# Patient Record
Sex: FEMALE | Race: WHITE | Employment: UNEMPLOYED | URBAN - METROPOLITAN AREA
[De-identification: names, ages, dates, MRNs, and addresses within clinical notes are randomized per-mention and may not be internally consistent; named-entity substitution may affect disease eponyms.]

---

## 2020-05-04 ENCOUNTER — APPOINTMENT (OUTPATIENT)
Dept: CT IMAGING | Age: 50
DRG: 754 | End: 2020-05-04
Payer: MEDICAID

## 2020-05-04 ENCOUNTER — HOSPITAL ENCOUNTER (INPATIENT)
Age: 50
LOS: 3 days | Discharge: HOME | DRG: 754 | End: 2020-05-07
Attending: EMERGENCY MEDICINE | Admitting: PSYCHIATRY & NEUROLOGY
Payer: MEDICAID

## 2020-05-04 PROBLEM — F39 UNSPECIFIED MOOD (AFFECTIVE) DISORDER (HCC): Status: ACTIVE | Noted: 2020-05-04

## 2020-05-04 LAB
A/G RATIO: 1.2 (ref 1.1–2.2)
ACETAMINOPHEN LEVEL: <5 UG/ML (ref 10–30)
ALBUMIN SERPL-MCNC: 4.7 G/DL (ref 3.4–5)
ALP BLD-CCNC: 176 U/L (ref 40–129)
ALT SERPL-CCNC: 65 U/L (ref 10–40)
AMPHETAMINE SCREEN, URINE: POSITIVE
ANION GAP SERPL CALCULATED.3IONS-SCNC: 14 MMOL/L (ref 3–16)
AST SERPL-CCNC: 59 U/L (ref 15–37)
BANDED NEUTROPHILS RELATIVE PERCENT: 1 % (ref 0–7)
BARBITURATE SCREEN URINE: ABNORMAL
BASOPHILS ABSOLUTE: 0 K/UL (ref 0–0.2)
BASOPHILS RELATIVE PERCENT: 0 %
BENZODIAZEPINE SCREEN, URINE: POSITIVE
BILIRUB SERPL-MCNC: 0.5 MG/DL (ref 0–1)
BUN BLDV-MCNC: 16 MG/DL (ref 7–20)
CALCIUM SERPL-MCNC: 9.9 MG/DL (ref 8.3–10.6)
CANNABINOID SCREEN URINE: POSITIVE
CHLORIDE BLD-SCNC: 98 MMOL/L (ref 99–110)
CO2: 25 MMOL/L (ref 21–32)
COCAINE METABOLITE SCREEN URINE: ABNORMAL
CREAT SERPL-MCNC: 0.9 MG/DL (ref 0.6–1.1)
EOSINOPHILS ABSOLUTE: 0.4 K/UL (ref 0–0.6)
EOSINOPHILS RELATIVE PERCENT: 4 %
ETHANOL: NORMAL MG/DL (ref 0–0.08)
GFR AFRICAN AMERICAN: >60
GFR NON-AFRICAN AMERICAN: >60
GLOBULIN: 3.9 G/DL
GLUCOSE BLD-MCNC: 134 MG/DL (ref 70–99)
HCT VFR BLD CALC: 47 % (ref 36–48)
HEMOGLOBIN: 16 G/DL (ref 12–16)
LYMPHOCYTES ABSOLUTE: 3 K/UL (ref 1–5.1)
LYMPHOCYTES RELATIVE PERCENT: 33 %
Lab: ABNORMAL
MCH RBC QN AUTO: 29.8 PG (ref 26–34)
MCHC RBC AUTO-ENTMCNC: 34 G/DL (ref 31–36)
MCV RBC AUTO: 87.5 FL (ref 80–100)
METHADONE SCREEN, URINE: ABNORMAL
MONOCYTES ABSOLUTE: 0.3 K/UL (ref 0–1.3)
MONOCYTES RELATIVE PERCENT: 3 %
NEUTROPHILS ABSOLUTE: 5.5 K/UL (ref 1.7–7.7)
NEUTROPHILS RELATIVE PERCENT: 59 %
OPIATE SCREEN URINE: ABNORMAL
OXYCODONE URINE: ABNORMAL
PDW BLD-RTO: 13.7 % (ref 12.4–15.4)
PH UA: 5
PHENCYCLIDINE SCREEN URINE: ABNORMAL
PLATELET # BLD: 363 K/UL (ref 135–450)
PMV BLD AUTO: 7.3 FL (ref 5–10.5)
POTASSIUM REFLEX MAGNESIUM: 4.4 MMOL/L (ref 3.5–5.1)
PROPOXYPHENE SCREEN: ABNORMAL
RBC # BLD: 5.37 M/UL (ref 4–5.2)
SALICYLATE, SERUM: <0.3 MG/DL (ref 15–30)
SODIUM BLD-SCNC: 137 MMOL/L (ref 136–145)
TOTAL PROTEIN: 8.6 G/DL (ref 6.4–8.2)
TSH SERPL DL<=0.05 MIU/L-ACNC: 1.49 UIU/ML (ref 0.27–4.2)
WBC # BLD: 9.1 K/UL (ref 4–11)

## 2020-05-04 PROCEDURE — 36415 COLL VENOUS BLD VENIPUNCTURE: CPT

## 2020-05-04 PROCEDURE — 85025 COMPLETE CBC W/AUTO DIFF WBC: CPT

## 2020-05-04 PROCEDURE — 6370000000 HC RX 637 (ALT 250 FOR IP): Performed by: EMERGENCY MEDICINE

## 2020-05-04 PROCEDURE — G0480 DRUG TEST DEF 1-7 CLASSES: HCPCS

## 2020-05-04 PROCEDURE — 84443 ASSAY THYROID STIM HORMONE: CPT

## 2020-05-04 PROCEDURE — 6370000000 HC RX 637 (ALT 250 FOR IP): Performed by: PSYCHIATRY & NEUROLOGY

## 2020-05-04 PROCEDURE — 80053 COMPREHEN METABOLIC PANEL: CPT

## 2020-05-04 PROCEDURE — U0003 INFECTIOUS AGENT DETECTION BY NUCLEIC ACID (DNA OR RNA); SEVERE ACUTE RESPIRATORY SYNDROME CORONAVIRUS 2 (SARS-COV-2) (CORONAVIRUS DISEASE [COVID-19]), AMPLIFIED PROBE TECHNIQUE, MAKING USE OF HIGH THROUGHPUT TECHNOLOGIES AS DESCRIBED BY CMS-2020-01-R: HCPCS

## 2020-05-04 PROCEDURE — 1240000000 HC EMOTIONAL WELLNESS R&B

## 2020-05-04 PROCEDURE — U0002 COVID-19 LAB TEST NON-CDC: HCPCS

## 2020-05-04 PROCEDURE — 70450 CT HEAD/BRAIN W/O DYE: CPT

## 2020-05-04 PROCEDURE — 99285 EMERGENCY DEPT VISIT HI MDM: CPT

## 2020-05-04 PROCEDURE — 80307 DRUG TEST PRSMV CHEM ANLYZR: CPT

## 2020-05-04 PROCEDURE — 93005 ELECTROCARDIOGRAM TRACING: CPT | Performed by: PHYSICIAN ASSISTANT

## 2020-05-04 RX ORDER — GABAPENTIN 400 MG/1
800 CAPSULE ORAL 3 TIMES DAILY
Status: DISCONTINUED | OUTPATIENT
Start: 2020-05-04 | End: 2020-05-07 | Stop reason: HOSPADM

## 2020-05-04 RX ORDER — BUPRENORPHINE AND NALOXONE 8; 2 MG/1; MG/1
2 FILM, SOLUBLE BUCCAL; SUBLINGUAL DAILY
COMMUNITY

## 2020-05-04 RX ORDER — GABAPENTIN 800 MG/1
800 TABLET ORAL 3 TIMES DAILY
COMMUNITY

## 2020-05-04 RX ORDER — IBUPROFEN 400 MG/1
400 TABLET ORAL EVERY 6 HOURS PRN
Status: DISCONTINUED | OUTPATIENT
Start: 2020-05-04 | End: 2020-05-07 | Stop reason: HOSPADM

## 2020-05-04 RX ORDER — TRAZODONE HYDROCHLORIDE 50 MG/1
50 TABLET ORAL NIGHTLY PRN
Status: DISCONTINUED | OUTPATIENT
Start: 2020-05-04 | End: 2020-05-07 | Stop reason: HOSPADM

## 2020-05-04 RX ORDER — OLANZAPINE 10 MG/1
10 INJECTION, POWDER, LYOPHILIZED, FOR SOLUTION INTRAMUSCULAR
Status: DISCONTINUED | OUTPATIENT
Start: 2020-05-04 | End: 2020-05-04

## 2020-05-04 RX ORDER — ACETAMINOPHEN 325 MG/1
650 TABLET ORAL EVERY 4 HOURS PRN
Status: DISCONTINUED | OUTPATIENT
Start: 2020-05-04 | End: 2020-05-07 | Stop reason: HOSPADM

## 2020-05-04 RX ORDER — MAGNESIUM HYDROXIDE/ALUMINUM HYDROXICE/SIMETHICONE 120; 1200; 1200 MG/30ML; MG/30ML; MG/30ML
30 SUSPENSION ORAL EVERY 6 HOURS PRN
Status: DISCONTINUED | OUTPATIENT
Start: 2020-05-04 | End: 2020-05-07 | Stop reason: HOSPADM

## 2020-05-04 RX ORDER — BENZTROPINE MESYLATE 1 MG/ML
2 INJECTION INTRAMUSCULAR; INTRAVENOUS 2 TIMES DAILY PRN
Status: DISCONTINUED | OUTPATIENT
Start: 2020-05-04 | End: 2020-05-07 | Stop reason: HOSPADM

## 2020-05-04 RX ORDER — HYDROXYZINE PAMOATE 50 MG/1
50 CAPSULE ORAL ONCE
Status: COMPLETED | OUTPATIENT
Start: 2020-05-04 | End: 2020-05-04

## 2020-05-04 RX ORDER — OLANZAPINE 10 MG/1
10 TABLET ORAL
Status: DISCONTINUED | OUTPATIENT
Start: 2020-05-04 | End: 2020-05-04

## 2020-05-04 RX ADMIN — HYDROXYZINE PAMOATE 50 MG: 50 CAPSULE ORAL at 18:22

## 2020-05-04 RX ADMIN — GABAPENTIN 800 MG: 400 CAPSULE ORAL at 22:49

## 2020-05-04 SDOH — HEALTH STABILITY: MENTAL HEALTH: HOW OFTEN DO YOU HAVE A DRINK CONTAINING ALCOHOL?: NEVER

## 2020-05-04 ASSESSMENT — SLEEP AND FATIGUE QUESTIONNAIRES
DIFFICULTY ARISING: NO
AVERAGE NUMBER OF SLEEP HOURS: 5
DO YOU USE A SLEEP AID: COMMENT
DIFFICULTY FALLING ASLEEP: YES
DO YOU HAVE DIFFICULTY SLEEPING: YES
RESTFUL SLEEP: NO
SLEEP PATTERN: DIFFICULTY FALLING ASLEEP;NIGHTMARES/TERRORS
DIFFICULTY STAYING ASLEEP: YES

## 2020-05-04 ASSESSMENT — PAIN DESCRIPTION - LOCATION: LOCATION: LEG

## 2020-05-04 ASSESSMENT — PATIENT HEALTH QUESTIONNAIRE - PHQ9: SUM OF ALL RESPONSES TO PHQ QUESTIONS 1-9: 18

## 2020-05-04 ASSESSMENT — PAIN DESCRIPTION - DESCRIPTORS: DESCRIPTORS: TINGLING

## 2020-05-04 ASSESSMENT — LIFESTYLE VARIABLES: HISTORY_ALCOHOL_USE: NO

## 2020-05-04 ASSESSMENT — PAIN SCALES - GENERAL: PAINLEVEL_OUTOF10: 5

## 2020-05-04 NOTE — ED PROVIDER NOTES
[address] ED  EMERGENCY DEPARTMENT ENCOUNTER        Pt Name: Claudene Brave  MRN: [id number]  [place] 1970  Date of evaluation: 5/4/2020  Provider: JEANNE Ward  PCP: Jeff Peña MD    This patient was seen and evaluated by the attending physician Chinedu Gonzalez MD.      [address] [place] [address]       Chief Complaint   Patient presents with    Suicidal     pt has thoughts of wanting to hurt herself. pt has hurt self in the past 2 months pt was going to OD pt states that she didnt take the pills. pt has thoughts of OD/cutting wrist.        HISTORY OF PRESENT ILLNESS   (Location/Symptom, Timing/Onset, Context/Setting, Quality, Duration, Modifying Factors, Severity)  Note limiting factors. Claudene Brave is a 53 y.o. female who presents via private vehicle for evaluation of suicidal ideation. Patient has significant past medical history of anxiety, depression, PTSD, substance abuse. She sees [facility] for substance abuse counseling. She notes that over the last several days she is felt more suicidal.  She has contemplated and actually attempted an overdose, she took a bunch of pills about 2 months ago however she then spit them out because \"I could just see my grandbaby when I did this\". Today she notes feeling more depressed than normal, she does not currently have a plan but notes that she is tired of feeling this way and tired of putting her  through this. She notes that she had a relapse, methamphetamine, approximately 1 month ago. She notes also that she has been having visual and auditory hallucinations for the last 4 weeks ever since relapsing on methamphetamine. These come and go, she notes that she has been seeing spots and she also notes that she has been hearing muffled voices. She cannot make out what the voices are saying. She is never had this before.   This sensation was worse right after she took the meth, it is since tapered down but not gone away completely. She endorses recent anxiety and panic attacks. Currently she denies any chest pain cough shortness of breath nausea vomiting abdominal pain. She notes intermittent abdominal pain and a chest pressure when she feels that she is having a panic attack, she denies any ripping tearing radiating pain or the pain lasting longer than the panic attack. She denies any recent changes in her medications. She does note that in the past she has been on Seroquel that helped. She notes significant family history of mood disorder. Nursing Notes were all reviewed and agreed with or any disagreements were addressed  in the HPI. REVIEW OF SYSTEMS    (2-9 systems for level 4, 10 or more for level 5)     Review of Systems    Positives and Pertinent negatives as per HPI. Except as noted abovein the ROS, all other systems were reviewed and negative. PAST MEDICAL HISTORY     Past Medical History:   Diagnosis Date    Anxiety     Depression     PTSD (post-traumatic stress disorder)          SURGICAL HISTORY     Past Surgical History:   Procedure Laterality Date    AMPUTATION      HAND SURGERY      HYSTERECTOMY           CURRENTMEDICATIONS       Previous Medications    ALBUTEROL IN    Inhale into the lungs    BUPRENORPHINE-NALOXONE (SUBOXONE) 8-2 MG FILM SL FILM    Place 2 Film under the tongue daily. Pt states that she takes 2 of the 8 and 2 of the 2mg    GABAPENTIN (NEURONTIN) 800 MG TABLET    Take 800 mg by mouth 3 times daily. ALLERGIES     Motrin [ibuprofen]    FAMILYHISTORY     No family history on file.        SOCIAL HISTORY       Social History     Socioeconomic History    Marital status:      Spouse name: Not on file    Number of children: Not on file    Years of education: Not on file    Highest education level: Not on file   Occupational History    Not on file   Social Needs    Financial resource strain: Not on file    Food insecurity     Worry: Not on file     Inability: Not on file   [employer] needs     Medical: Not on file     Non-medical: Not on file   Tobacco Use    Smoking status: Current Every Day Smoker     Packs/day: 1.00     Types: Cigarettes   Substance and Sexual Activity    Alcohol use: Never     Frequency: Never    Drug use: Not on file    Sexual activity: Not on file   Lifestyle    Physical activity     Days per week: Not on file     Minutes per session: Not on file    Stress: Not on file   Relationships    Social connections     Talks on phone: Not on file     Gets together: Not on file     Attends Holiness service: Not on file     Active member of club or organization: Not on file     Attends meetings of clubs or organizations: Not on file     Relationship status: Not on file    Intimate partner violence     Fear of current or ex partner: Not on file     Emotionally abused: Not on file     Physically abused: Not on file     Forced sexual activity: Not on file   Other Topics Concern    Not on file   Social History Narrative    Not on file       SCREENINGS             PHYSICAL EXAM    (up to 7 for level 4, 8 or more for level 5)     ED Triage Vitals [05/04/20 1543]   BP Temp Temp Source Pulse Resp SpO2 Height Weight   (!) 129/93 98.4 °F (36.9 °C) Oral 129 18 97 % 5' 2\" (1.575 m) 145 lb (65.8 kg)       Physical Exam  PHYSICAL EXAM  /78   Pulse 93   Temp 98.4 °F (36.9 °C) (Oral)   Resp 16   Ht 5' 2\" (1.575 m)   Wt 145 lb (65.8 kg)   SpO2 95%   BMI 26.52 kg/m²   GENERAL APPEARANCE: Awake and alert. Cooperative. Adult female sitting upright in exam bed. She is nondiaphoretic breathing comfortably on room air showing no sign of acute respiratory distress. HEAD: Normocephalic. Atraumatic. EYES: PERRL. EOM's grossly intact. ENT: Mucous membranes are moist.   NECK: Supple. HEART: RRR. No murmurs. Radial pulses 2+ symmetric, PT pulses 1+ symmetric  LUNGS: Respirations unlabored. CTAB. Good air exchange. Speaking comfortably in full sentences.

## 2020-05-05 LAB
CHOLESTEROL, TOTAL: 199 MG/DL (ref 0–199)
EKG ATRIAL RATE: 120 BPM
EKG ATRIAL RATE: 67 BPM
EKG DIAGNOSIS: NORMAL
EKG DIAGNOSIS: NORMAL
EKG P AXIS: 70 DEGREES
EKG P-R INTERVAL: 156 MS
EKG P-R INTERVAL: 158 MS
EKG Q-T INTERVAL: 302 MS
EKG Q-T INTERVAL: 376 MS
EKG QRS DURATION: 82 MS
EKG QRS DURATION: 88 MS
EKG QTC CALCULATION (BAZETT): 397 MS
EKG QTC CALCULATION (BAZETT): 426 MS
EKG R AXIS: 64 DEGREES
EKG R AXIS: 66 DEGREES
EKG T AXIS: 145 DEGREES
EKG T AXIS: 65 DEGREES
EKG VENTRICULAR RATE: 120 BPM
EKG VENTRICULAR RATE: 67 BPM
ESTIMATED AVERAGE GLUCOSE: 96.8 MG/DL
HBA1C MFR BLD: 5 %
HDLC SERPL-MCNC: 46 MG/DL (ref 40–60)
LDL CHOLESTEROL CALCULATED: 126 MG/DL
SARS-COV-2, NAAT: NOT DETECTED
TRIGL SERPL-MCNC: 137 MG/DL (ref 0–150)
VLDLC SERPL CALC-MCNC: 27 MG/DL

## 2020-05-05 PROCEDURE — 99223 1ST HOSP IP/OBS HIGH 75: CPT | Performed by: PSYCHIATRY & NEUROLOGY

## 2020-05-05 PROCEDURE — 6370000000 HC RX 637 (ALT 250 FOR IP): Performed by: PSYCHIATRY & NEUROLOGY

## 2020-05-05 PROCEDURE — 93010 ELECTROCARDIOGRAM REPORT: CPT | Performed by: INTERNAL MEDICINE

## 2020-05-05 PROCEDURE — 36415 COLL VENOUS BLD VENIPUNCTURE: CPT

## 2020-05-05 PROCEDURE — 99222 1ST HOSP IP/OBS MODERATE 55: CPT | Performed by: PHYSICIAN ASSISTANT

## 2020-05-05 PROCEDURE — 1240000000 HC EMOTIONAL WELLNESS R&B

## 2020-05-05 PROCEDURE — 83036 HEMOGLOBIN GLYCOSYLATED A1C: CPT

## 2020-05-05 PROCEDURE — 93005 ELECTROCARDIOGRAM TRACING: CPT | Performed by: PHYSICIAN ASSISTANT

## 2020-05-05 PROCEDURE — 80061 LIPID PANEL: CPT

## 2020-05-05 RX ORDER — BUPRENORPHINE 2 MG/1
2 TABLET SUBLINGUAL DAILY
Status: DISCONTINUED | OUTPATIENT
Start: 2020-05-05 | End: 2020-05-06

## 2020-05-05 RX ORDER — NICOTINE 21 MG/24HR
1 PATCH, TRANSDERMAL 24 HOURS TRANSDERMAL DAILY
Status: DISCONTINUED | OUTPATIENT
Start: 2020-05-05 | End: 2020-05-07 | Stop reason: HOSPADM

## 2020-05-05 RX ORDER — BUPRENORPHINE AND NALOXONE 2; .5 MG/1; MG/1
1 FILM, SOLUBLE BUCCAL; SUBLINGUAL 2 TIMES DAILY
COMMUNITY

## 2020-05-05 RX ORDER — QUETIAPINE FUMARATE 25 MG/1
25 TABLET, FILM COATED ORAL 2 TIMES DAILY
Status: DISCONTINUED | OUTPATIENT
Start: 2020-05-05 | End: 2020-05-06

## 2020-05-05 RX ORDER — LAMOTRIGINE 25 MG/1
50 TABLET ORAL DAILY
Status: DISCONTINUED | OUTPATIENT
Start: 2020-05-05 | End: 2020-05-07 | Stop reason: HOSPADM

## 2020-05-05 RX ORDER — QUETIAPINE FUMARATE 100 MG/1
100 TABLET, FILM COATED ORAL NIGHTLY
Status: DISCONTINUED | OUTPATIENT
Start: 2020-05-05 | End: 2020-05-06

## 2020-05-05 RX ORDER — BUPRENORPHINE AND NALOXONE 8; 2 MG/1; MG/1
1 FILM, SOLUBLE BUCCAL; SUBLINGUAL DAILY
Status: DISCONTINUED | OUTPATIENT
Start: 2020-05-05 | End: 2020-05-06

## 2020-05-05 RX ADMIN — GABAPENTIN 800 MG: 400 CAPSULE ORAL at 08:53

## 2020-05-05 RX ADMIN — GABAPENTIN 800 MG: 400 CAPSULE ORAL at 21:15

## 2020-05-05 RX ADMIN — GABAPENTIN 800 MG: 400 CAPSULE ORAL at 12:54

## 2020-05-05 RX ADMIN — QUETIAPINE FUMARATE 100 MG: 100 TABLET ORAL at 21:15

## 2020-05-05 RX ADMIN — LAMOTRIGINE 50 MG: 25 TABLET ORAL at 12:53

## 2020-05-05 RX ADMIN — BUPRENORPHINE 2 MG: 2 TABLET SUBLINGUAL at 12:56

## 2020-05-05 RX ADMIN — BUPRENORPHINE HYDROCHLORIDE, NALOXONE HYDROCHLORIDE 1 FILM: 8; 2 FILM, SOLUBLE BUCCAL; SUBLINGUAL at 12:55

## 2020-05-05 RX ADMIN — QUETIAPINE FUMARATE 25 MG: 25 TABLET ORAL at 12:54

## 2020-05-05 ASSESSMENT — SLEEP AND FATIGUE QUESTIONNAIRES
DIFFICULTY STAYING ASLEEP: YES
AVERAGE NUMBER OF SLEEP HOURS: 5
RESTFUL SLEEP: NO
DIFFICULTY ARISING: NO
DIFFICULTY FALLING ASLEEP: YES
DO YOU HAVE DIFFICULTY SLEEPING: YES
DO YOU USE A SLEEP AID: YES
SLEEP PATTERN: DIFFICULTY FALLING ASLEEP

## 2020-05-05 ASSESSMENT — PAIN SCALES - GENERAL
PAINLEVEL_OUTOF10: 0
PAINLEVEL_OUTOF10: 0

## 2020-05-05 ASSESSMENT — LIFESTYLE VARIABLES: HISTORY_ALCOHOL_USE: NO

## 2020-05-05 ASSESSMENT — PATIENT HEALTH QUESTIONNAIRE - PHQ9: SUM OF ALL RESPONSES TO PHQ QUESTIONS 1-9: 17

## 2020-05-05 NOTE — H&P
05/04/2020 14  3 - 16 Final    Glucose 05/04/2020 134* 70 - 99 mg/dL Final    BUN 05/04/2020 16  7 - 20 mg/dL Final    CREATININE 05/04/2020 0.9  0.6 - 1.1 mg/dL Final    GFR Non- 05/04/2020 >60  >60 Final    Comment: >60 mL/min/1.73m2 EGFR, calc. for ages 25 and older using the  MDRD formula (not corrected for weight), is valid for stable  renal function.  GFR  05/04/2020 >60  >60 Final    Comment: Chronic Kidney Disease: less than 60 ml/min/1.73 sq.m. Kidney Failure: less than 15 ml/min/1.73 sq.m. Results valid for patients 18 years and older.  Calcium 05/04/2020 9.9  8.3 - 10.6 mg/dL Final    Total Protein 05/04/2020 8.6* 6.4 - 8.2 g/dL Final    Alb 05/04/2020 4.7  3.4 - 5.0 g/dL Final    Albumin/Globulin Ratio 05/04/2020 1.2  1.1 - 2.2 Final    Total Bilirubin 05/04/2020 0.5  0.0 - 1.0 mg/dL Final    Alkaline Phosphatase 05/04/2020 176* 40 - 129 U/L Final    ALT 05/04/2020 65* 10 - 40 U/L Final    AST 05/04/2020 59* 15 - 37 U/L Final    Globulin 05/04/2020 3.9  g/dL Final    Amphetamine Screen, Urine 05/04/2020 POSITIVE* Negative <1000ng/mL Final    Comment: High concentrations of ephedrine/pseudoephedrine or  phenylpropanolamine may cause false positive results  for amphetamine. Therefore, confirmatory testing for  amphetamine should be considered if clinically indicated.  Barbiturate Screen, Ur 05/04/2020 Neg  Negative <200 ng/mL Final    Benzodiazepine Screen, Urine 05/04/2020 POSITIVE* Negative <200 ng/mL Final    Cannabinoid Scrn, Ur 05/04/2020 POSITIVE* Negative <50 ng/mL Final    Cocaine Metabolite Screen, Urine 05/04/2020 Neg  Negative <300 ng/mL Final    Opiate Scrn, Ur 05/04/2020 Neg  Negative <300 ng/mL Final    Comment: \"Therapeutic levels of pain medication, especially oxycontin and synthetic  opioids, may not be detected by this Methodology.  Pain management screen  panel  Drug panel-PM-Hi Res Ur, Interp (PAIN) should be

## 2020-05-05 NOTE — H&P
Hospital Medicine History & Physical      PCP: Fátima Fajardo MD    Date of Admission: 5/4/2020    Date of Service: Pt seen/examined on 5/5/2020     Chief Complaint:    Chief Complaint   Patient presents with    Suicidal     pt has thoughts of wanting to hurt herself. pt has hurt self in the past 2 months pt was going to OD pt states that she didnt take the pills. pt has thoughts of OD/cutting wrist.      History Of Present Illness: The patient is a 53 y.o. female with a PMH of anxiety, depression, polysubstance abuse who presented to [facility] for suicidal ideation. Patient was seen and evaluated in the ED by the ED medical provider, patient was medically cleared for admission to [facility] at [facility]. This note serves as an admission medical H&P.     PCP: Fátima Fajardo MD  Tobacco Use: yes, 1 ppd  EtOH Use: denies  Illicit Drug Use: yes, relapsed 1 months ago,  UDS + amphetamines, cannabis and benzos    Past Medical History:        Diagnosis Date    Amputation above knee ([address]     Anxiety     Depression     PTSD (post-traumatic stress disorder)        Past Surgical History:        Procedure Laterality Date    AMPUTATION      HAND SURGERY      HYSTERECTOMY         Medications Prior to Admission:    Prior to Admission medications    Medication Sig Start Date End Date Taking? Authorizing Provider   buprenorphine-naloxone (SUBOXONE) 2-0.5 MG FILM SL film Place 1 Film under the tongue 2 times daily. Yes Historical Provider, MD   gabapentin (NEURONTIN) 800 MG tablet Take 800 mg by mouth 3 times daily. Yes Historical Provider, MD   buprenorphine-naloxone (SUBOXONE) 8-2 MG FILM SL film Place 2 Film under the tongue daily. Takes with the 2 mg twice daily    Historical Provider, MD   ALBUTEROL IN Inhale into the lungs    Historical Provider, MD       Allergies:  Zyprexa [olanzapine];  Mushroom extract complex; and Motrin [ibuprofen]    Social History:  The patient currently lives at home with her  TOBACCO:   reports that she has been smoking cigarettes. She has been smoking about 1.00 pack per day. She has never used smokeless tobacco.  ETOH:   reports no history of alcohol use. Family History:   Positive as follows:    History reviewed. No pertinent family history. REVIEW OF SYSTEMS:     Constitutional: Negative for fever   HENT: Negative for sore throat   Eyes: Negative for redness   Respiratory: + chronic cough  Cardiovascular: Negative for chest pain   Gastrointestinal: Negative for vomiting, diarrhea   Genitourinary: Negative for hematuria   Musculoskeletal: Negative for arthralgias   Skin: Negative for rash   Neurological: Negative for syncope   Hematological: Negative for adenopathy   Psychiatric/Behavorial: Negative for anxiety    PHYSICAL EXAM:    /65   Pulse 111   Temp 98.2 °F (36.8 °C) (Oral)   Resp 16   Ht 5' 2\" (1.575 m)   Wt 145 lb (65.8 kg)   SpO2 96%   BMI 26.52 kg/m²     Gen: No distress. Alert. Eyes: PERRL. No sclera icterus. No conjunctival injection. ENT: No discharge. Pharynx clear. Neck:  Trachea midline. Resp: No accessory muscle use. No crackles. + wheezes. + rhonchi. CV: Regular rate. Regular rhythm. No murmur. No rub. No edema. GI: Non-tender. Non-distended. Normal bowel sounds. No hernia. Skin: Warm and dry. Well healed scars to R forearm and R wrist   M/S: No cyanosis. No clubbing. Neuro: Awake. Grossly nonfocal, CN II-XII intact    Psych: Oriented x 3. No anxiety or agitation.      CBC:   Recent Labs     05/04/20  1555   WBC 9.1   HGB 16.0   HCT 47.0   MCV 87.5        BMP:   Recent Labs     05/04/20  1555      K 4.4   CL 98*   CO2 25   BUN 16   CREATININE 0.9     LIVER PROFILE:   Recent Labs     05/04/20  1555   AST 59*   ALT 65*   BILITOT 0.5   ALKPHOS 176*     UA:  Recent Labs     05/04/20  1555   PHUR 5.0      UDS: + amphetamines, Benzodiazepines, + Cannabinoid     CULTURES  SARS-CoV-2: not detected    EKG:  I have reviewed

## 2020-05-05 NOTE — ED NOTES
Level of Care Disposition: Admit      Patient was seen by ED provider and [facility] staff. The case presented to psychiatric provider on-call Shweta Mcdermott. Based on the ED evaluation and information presented to the provider by Arabella Alexander it was determined that inpatient hospitalization is the least restrictive environment for the patient at this time. The patient will be admitted to the inpatient unit. Admitting provider did not order suicide precautions based on pt is loly for safety in a hospital setting.       RATIONALE FOR ADMISSION:    Patient at imminent risk of danger to self as demonstrated by suicidal ideations and unable to contract for safety if D/C    Insurance Pre certification Authorization: Self pay         Joanna Dangelo [place]  05/04/20 2126

## 2020-05-05 NOTE — ED PROVIDER NOTES
I independently performed a history and physical on Shubham Bledsoe. All diagnostic, treatment, and disposition decisions were made by myself in conjunction with the advanced practice provider. For further details of [facility] emergency department encounter, please see the advance practice provider's documentation. In brief, this is a 53y.o. year old female, with   Chief Complaint   Patient presents with    Suicidal     pt has thoughts of wanting to hurt herself. pt has hurt self in the past 2 months pt was going to OD pt states that she didnt take the pills. pt has thoughts of OD/cutting wrist.      Patient came in secondary to having some thoughts of wanting to hurt herself. She recently relapsed on methamphetamines again. She has not used in over a month however. On examination I find a tearful female patient. Clear to auscultation bilaterally and regular rate rhythm without gallop rub or murmur. Abdomen is soft and nontender. Patient's work-up at this time reveals some benzodiazepines, amphetamines, cannabinoids. She was medically cleared for evaluation by the behavioral access center. They have determined that she needs inpatient admission therefore the patient was admitted.     EKG Interpretation    Interpreted by emergency department physician    Rhythm: sinus tachycardia  Rate: tachycardia  Axis: normal  Ectopy: none  Conduction: right ventricular conduction delay  ST Segments: nonspecific changes  T Waves: non specific changes  Q Waves: none    Clinical Impression: Sinus tachycardia, right ventricular conduction delay, nonspecific ST and T wave changes    David Ley MD  05/05/20 0005

## 2020-05-06 ENCOUNTER — APPOINTMENT (OUTPATIENT)
Dept: GENERAL RADIOLOGY | Age: 50
DRG: 754 | End: 2020-05-06
Payer: MEDICAID

## 2020-05-06 PROCEDURE — 6370000000 HC RX 637 (ALT 250 FOR IP): Performed by: PSYCHIATRY & NEUROLOGY

## 2020-05-06 PROCEDURE — 99233 SBSQ HOSP IP/OBS HIGH 50: CPT | Performed by: PSYCHIATRY & NEUROLOGY

## 2020-05-06 PROCEDURE — 97165 OT EVAL LOW COMPLEX 30 MIN: CPT

## 2020-05-06 PROCEDURE — 1240000000 HC EMOTIONAL WELLNESS R&B

## 2020-05-06 PROCEDURE — 97535 SELF CARE MNGMENT TRAINING: CPT

## 2020-05-06 PROCEDURE — 71046 X-RAY EXAM CHEST 2 VIEWS: CPT

## 2020-05-06 RX ORDER — BUPRENORPHINE 2 MG/1
4 TABLET SUBLINGUAL DAILY
Status: DISCONTINUED | OUTPATIENT
Start: 2020-05-07 | End: 2020-05-07 | Stop reason: HOSPADM

## 2020-05-06 RX ORDER — GUAIFENESIN 600 MG/1
600 TABLET, EXTENDED RELEASE ORAL 2 TIMES DAILY
Status: DISCONTINUED | OUTPATIENT
Start: 2020-05-06 | End: 2020-05-07 | Stop reason: HOSPADM

## 2020-05-06 RX ORDER — BUPRENORPHINE AND NALOXONE 8; 2 MG/1; MG/1
1 FILM, SOLUBLE BUCCAL; SUBLINGUAL ONCE
Status: COMPLETED | OUTPATIENT
Start: 2020-05-06 | End: 2020-05-06

## 2020-05-06 RX ORDER — BUPRENORPHINE 2 MG/1
2 TABLET SUBLINGUAL ONCE
Status: COMPLETED | OUTPATIENT
Start: 2020-05-06 | End: 2020-05-06

## 2020-05-06 RX ORDER — BUPRENORPHINE AND NALOXONE 8; 2 MG/1; MG/1
2 FILM, SOLUBLE BUCCAL; SUBLINGUAL DAILY
Status: DISCONTINUED | OUTPATIENT
Start: 2020-05-07 | End: 2020-05-07 | Stop reason: HOSPADM

## 2020-05-06 RX ORDER — QUETIAPINE FUMARATE 25 MG/1
25 TABLET, FILM COATED ORAL
Status: DISCONTINUED | OUTPATIENT
Start: 2020-05-06 | End: 2020-05-07 | Stop reason: HOSPADM

## 2020-05-06 RX ORDER — QUETIAPINE FUMARATE 200 MG/1
200 TABLET, FILM COATED ORAL NIGHTLY
Status: DISCONTINUED | OUTPATIENT
Start: 2020-05-06 | End: 2020-05-07 | Stop reason: HOSPADM

## 2020-05-06 RX ORDER — QUETIAPINE FUMARATE 100 MG/1
100 TABLET, FILM COATED ORAL NIGHTLY PRN
Status: DISCONTINUED | OUTPATIENT
Start: 2020-05-06 | End: 2020-05-07 | Stop reason: HOSPADM

## 2020-05-06 RX ADMIN — GABAPENTIN 800 MG: 400 CAPSULE ORAL at 08:21

## 2020-05-06 RX ADMIN — GUAIFENESIN 600 MG: 600 TABLET, EXTENDED RELEASE ORAL at 21:16

## 2020-05-06 RX ADMIN — GUAIFENESIN 600 MG: 600 TABLET, EXTENDED RELEASE ORAL at 12:15

## 2020-05-06 RX ADMIN — TRAZODONE HYDROCHLORIDE 50 MG: 50 TABLET ORAL at 01:23

## 2020-05-06 RX ADMIN — QUETIAPINE FUMARATE 25 MG: 25 TABLET ORAL at 08:21

## 2020-05-06 RX ADMIN — QUETIAPINE FUMARATE 200 MG: 200 TABLET ORAL at 21:16

## 2020-05-06 RX ADMIN — BUPRENORPHINE HYDROCHLORIDE, NALOXONE HYDROCHLORIDE 1 FILM: 8; 2 FILM, SOLUBLE BUCCAL; SUBLINGUAL at 15:14

## 2020-05-06 RX ADMIN — BUPRENORPHINE 2 MG: 2 TABLET SUBLINGUAL at 15:13

## 2020-05-06 RX ADMIN — BUPRENORPHINE HYDROCHLORIDE, NALOXONE HYDROCHLORIDE 1 FILM: 8; 2 FILM, SOLUBLE BUCCAL; SUBLINGUAL at 08:22

## 2020-05-06 RX ADMIN — BUPRENORPHINE 2 MG: 2 TABLET SUBLINGUAL at 08:22

## 2020-05-06 RX ADMIN — QUETIAPINE FUMARATE 25 MG: 25 TABLET ORAL at 17:02

## 2020-05-06 RX ADMIN — QUETIAPINE FUMARATE 25 MG: 25 TABLET ORAL at 21:17

## 2020-05-06 RX ADMIN — QUETIAPINE FUMARATE 25 MG: 25 TABLET ORAL at 12:15

## 2020-05-06 RX ADMIN — LAMOTRIGINE 50 MG: 25 TABLET ORAL at 08:21

## 2020-05-06 RX ADMIN — GABAPENTIN 800 MG: 400 CAPSULE ORAL at 15:13

## 2020-05-06 RX ADMIN — QUETIAPINE FUMARATE 100 MG: 100 TABLET ORAL at 22:39

## 2020-05-06 RX ADMIN — GABAPENTIN 800 MG: 400 CAPSULE ORAL at 21:16

## 2020-05-06 ASSESSMENT — PAIN SCALES - GENERAL
PAINLEVEL_OUTOF10: 8
PAINLEVEL_OUTOF10: 7

## 2020-05-06 NOTE — GROUP NOTE
Group Therapy Note    Date: 5/6/2020    Group Start Time: 1330  Group End Time: 2264  Group Topic: Cognitive Skills    [facility]    Group Therapy Note    Attendees: 12    Patients learned about identifying cognitive distortions that perpetuate automatic negative thinking and group discussed strategies for challenging their cognitive distortions. At end of group, patients practiced grounding for 5,4,3,2,1 skill. Notes:  Pt was engaged and as she participated in group discussion appropriately and appeared to listen to peers during group. Status After Intervention:  Improved    Participation Level:  Active Listener and Interactive    Participation Quality: Appropriate, Attentive and Sharing      Speech:  normal      Thought Process/Content: Logical  Linear      Affective Functioning: Constricted/Restricted      Mood: anxious and depressed      Level of consciousness:  Alert and Oriented x4      Response to Learning: Able to verbalize current knowledge/experience, Able to verbalize/acknowledge new learning and Able to retain information      Endings: None Reported    Modes of Intervention: Education, Support, Socialization, Exploration, Clarifying, Problem-solving and Activity      Discipline Responsible: /Counselor      Signature:  CLAUDIA Sam-S, R-EMRET

## 2020-05-06 NOTE — PROGRESS NOTES
Covid specimen sent to lab.
Gave prn Trazadone for sleep. Med was effective.
conduction delayNonspecific ST and T wave abnormalityAbnormal ECGNo previous ECGs availableConfirmed by Brianna Zhong MD, ANA M ([id number]) on 5/5/2020 5:31:05 AM   Final    WBC 05/04/2020 9.1  4.0 - 11.0 K/uL Final    RBC 05/04/2020 5.37* 4.00 - 5.20 M/uL Final    Hemoglobin 05/04/2020 16.0  12.0 - 16.0 g/dL Final    Hematocrit 05/04/2020 47.0  36.0 - 48.0 % Final    MCV 05/04/2020 87.5  80.0 - 100.0 fL Final    MCH 05/04/2020 29.8  26.0 - 34.0 pg Final    MCHC 05/04/2020 34.0  31.0 - 36.0 g/dL Final    RDW 05/04/2020 13.7  12.4 - 15.4 % Final    Platelets 97/19/6158 363  135 - 450 K/uL Final    MPV 05/04/2020 7.3  5.0 - 10.5 fL Final    Neutrophils % 05/04/2020 59.0  % Final    Lymphocytes % 05/04/2020 33.0  % Final    Monocytes % 05/04/2020 3.0  % Final    Eosinophils % 05/04/2020 4.0  % Final    Basophils % 05/04/2020 0.0  % Final    Neutrophils Absolute 05/04/2020 5.5  1.7 - 7.7 K/uL Final    Lymphocytes Absolute 05/04/2020 3.0  1.0 - 5.1 K/uL Final    Monocytes Absolute 05/04/2020 0.3  0.0 - 1.3 K/uL Final    Eosinophils Absolute 05/04/2020 0.4  0.0 - 0.6 K/uL Final    Basophils Absolute 05/04/2020 0.0  0.0 - 0.2 K/uL Final    Bands Relative 05/04/2020 1  0 - 7 % Final    Sodium 05/04/2020 137  136 - 145 mmol/L Final    Potassium reflex Magnesium 05/04/2020 4.4  3.5 - 5.1 mmol/L Final    Chloride 05/04/2020 98* 99 - 110 mmol/L Final    CO2 05/04/2020 25  21 - 32 mmol/L Final    Anion Gap 05/04/2020 14  3 - 16 Final    Glucose 05/04/2020 134* 70 - 99 mg/dL Final    BUN 05/04/2020 16  7 - 20 mg/dL Final    CREATININE 05/04/2020 0.9  0.6 - 1.1 mg/dL Final    GFR Non- 05/04/2020 >60  >60 Final    Comment: >60 mL/min/1.73m2 EGFR, calc. for ages 25 and older using the  MDRD formula (not corrected for weight), is valid for stable  renal function.  GFR  05/04/2020 >60  >60 Final    Comment: Chronic Kidney Disease: less than 60 ml/min/1.73 sq.m.           Kidney
home.  Steps to enter first floor:    [] No steps     []  Steps to enter   [x] Alexis    Bathroom: [] Bath Tub Shower  [x]Walk in [employer]  [] Flocations owned: [x] RW [] SW  []Rollator []W/C  [x]Shower Chair []BSC []Reacher  [employer] [] Other     Preadmission Status / PLOF:  History of falls   [x]Yes  []No, difficulties with prothesis without the walker  Pt. Able to drive   []Yes  [x]No    Pt Fully independent for ADLs/IADLs. []Yes  [x]No    Pt. Required assistance from family for:  []Bathing [x]Dressing []Cooking []Cleaning  []Laundry  []Other :   Pt. Fully independent for transfers and gait and walked with: []No Device  [x]Walker(walker broke at home)   [employer]  Sleep Hygiene:   4 hrs, fragmented sleep,   Income: SSDI, waiting on stimulus check  Financial Management:  Mother in law helps with finances as payee  Leisure Interests:  Gardening, drawing, woodworking,   Medication Management: manages own meds, educated on strategies with pill box and alarm on phone  Health Management: yes PCP, no psychologist, yes therapist  Social Network:  Daughters, aunt and uncle, mother in law, ,   Stressors:   1)  2) house -getting it fixed 3) drugs  Coping Skills:  Breathing, napping, think of colors as a distraction, walking,     Pain  [x]Yes  []No  Ratin   Location: right shoulder   Pain Medicine Status: [x] Denies need  [] Pain med requested  [] RN notified. Cognition    A&Ox4, patient appropriate tearful and upset at times when talking about her  and not being able to know what is reality and false thinking her  is cheating on her  [de-identified] []1 step and [x]3 step commands. Upper Extremity ROM:    WFL, pt able to perform all bed mobility, transfers, and gait without ROM limitation. Upper Extremity Strength:    WFL, pt able to perform all bed mobility, transfers, and gait without strength limitation. Upper Extremity Sensation:    No apparent deficits.     Upper Extremity

## 2020-05-06 NOTE — PLAN OF CARE
Pt has been isolative to her room tonight. She was medication compliant and did not attend group. She stated she feels tired tonight and hopes to sleep better than yesterday. Will monitor.   Katelyn MCCABEN, RN-BC

## 2020-05-07 VITALS
SYSTOLIC BLOOD PRESSURE: 92 MMHG | HEIGHT: 62 IN | TEMPERATURE: 98 F | WEIGHT: 145 LBS | DIASTOLIC BLOOD PRESSURE: 52 MMHG | OXYGEN SATURATION: 95 % | HEART RATE: 100 BPM | BODY MASS INDEX: 26.68 KG/M2 | RESPIRATION RATE: 20 BRPM

## 2020-05-07 PROCEDURE — 6370000000 HC RX 637 (ALT 250 FOR IP): Performed by: PSYCHIATRY & NEUROLOGY

## 2020-05-07 PROCEDURE — 99239 HOSP IP/OBS DSCHRG MGMT >30: CPT | Performed by: PSYCHIATRY & NEUROLOGY

## 2020-05-07 PROCEDURE — 5130000000 HC BRIDGE APPOINTMENT

## 2020-05-07 RX ORDER — LAMOTRIGINE 25 MG/1
50 TABLET ORAL DAILY
Qty: 60 TABLET | Refills: 0 | Status: SHIPPED | OUTPATIENT
Start: 2020-05-08

## 2020-05-07 RX ORDER — QUETIAPINE FUMARATE 300 MG/1
300 TABLET, FILM COATED ORAL NIGHTLY
Qty: 30 TABLET | Refills: 0 | Status: SHIPPED | OUTPATIENT
Start: 2020-05-07

## 2020-05-07 RX ORDER — GUAIFENESIN 600 MG/1
600 TABLET, EXTENDED RELEASE ORAL 2 TIMES DAILY
Qty: 14 TABLET | Refills: 0 | Status: SHIPPED | OUTPATIENT
Start: 2020-05-07 | End: 2020-05-14

## 2020-05-07 RX ORDER — QUETIAPINE FUMARATE 25 MG/1
25 TABLET, FILM COATED ORAL 4 TIMES DAILY PRN
Qty: 120 TABLET | Refills: 0 | Status: SHIPPED | OUTPATIENT
Start: 2020-05-07

## 2020-05-07 RX ADMIN — BUPRENORPHINE 4 MG: 2 TABLET SUBLINGUAL at 10:59

## 2020-05-07 RX ADMIN — QUETIAPINE FUMARATE 25 MG: 25 TABLET ORAL at 08:37

## 2020-05-07 RX ADMIN — GUAIFENESIN 600 MG: 600 TABLET, EXTENDED RELEASE ORAL at 08:36

## 2020-05-07 RX ADMIN — LAMOTRIGINE 50 MG: 25 TABLET ORAL at 08:36

## 2020-05-07 RX ADMIN — BUPRENORPHINE HYDROCHLORIDE, NALOXONE HYDROCHLORIDE 2 FILM: 8; 2 FILM, SOLUBLE BUCCAL; SUBLINGUAL at 11:00

## 2020-05-07 RX ADMIN — GABAPENTIN 800 MG: 400 CAPSULE ORAL at 08:37

## 2020-05-07 ASSESSMENT — PAIN SCALES - GENERAL
PAINLEVEL_OUTOF10: 4
PAINLEVEL_OUTOF10: 4

## 2020-05-07 NOTE — BH NOTE
Blood pressure taken manually after low per machine. 72/52 R , 72/40 L and apical pule 100 & regular. Did acknowledge light=headedness when turning head. Given a water pitcher & encourage to sit & drink fluids. Charge nurse informed. Will hold Suboxone at present.
Cont's 70/ 40. Drank a pitcher of water. Now having cranberry & grape juice. Cont's alert. Will monitor.
Patient currently admitted to [facility]. Psychiatry consult discontinued.
with the discharge plan using the teachback method.      Referral for Outpatient Tobacco Cessation Counseling, upon discharge (lorenzo X if applicable and completed):    ( )  Hospital staff assisted patient to call Quit Line or faxed referral                                   during hospitalization                  ( )  Recognizing danger situations (included triggers and roadblocks), if not completed on admission                    ( )  Coping skills (new ways to manage stress, exercise, relaxation techniques, changing routine, distraction), if not completed on admission                                                           ( x)  Basic information about quitting (benefits of quitting, techniques in how to quit, available resources, if not completed on admission  ( ) [facility]   ( ) Patient refused referral  (x ) Patient refused counseling  ( ) Patient refused smoking cessation medication upon discharge    Vaccinations (lorenzo X if applicable and completed):  ( ) Patient states already received influenza vaccine elsewhere  ( ) Patient received influenza vaccine during this hospitalization  ( x) Patient refused influenza vaccine at this time      Caprice Kang RN

## 2020-05-07 NOTE — PLAN OF CARE
Problem: Altered Mood, Depressive Behavior:  Goal: Able to verbalize and/or display a decrease in depressive symptoms  Description: Able to verbalize and/or display a decrease in depressive symptoms  Outcome: Ongoing   Brightened when acknoledged when she approached desk. Participating in group.  Ate breakfast.

## 2020-05-07 NOTE — DISCHARGE SUMMARY
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 Pt stated that she is hearing voices that are muffled and she can not tell me what they say. Pt stated that she has been under a lot of stress. Pt stated that both daughters are pregnant, she has one y/o grandson that she takes care of frequently per pt everyday. Pt also reports feeling lock up with the quarantine and she stated relational problems with  of 20 yrs who left for 4 yrs and now he has been back for 2 yrs and they are still getting use been together. Pt stated that she feels dep, anhedonia, hopeless, helpless, dec energy,dec sleep, rt, isolating, dec conc, crying spells. Pt denies any victor manuel or hypomania at this time but has had inc irritability on ad. Pt stated that she feels tense all the time. She admits to smoking pot daily to help with anxiety and sleep but lately pot has been causing rt and voices. We discussed possibility of pot been laced with amphetamines. Pt admits to relapse several weeks ago on meth not recently. Hospital Course Pt was admitted for worsening depression, anxiety and si. Pt was started on seroquel and lamictal since she had no response to ad or she had bad reaction to ad. Pt tolerated meds well it seems anxiety and dec sleep main sx's.  Pt had uneventful hospital course and she had improvements of her sx's. Pt Oarrs was reviewed and sunrise called to confirmed suboxone dose before continuing. Pt attended grps and was social in the unit. Patient appears to be in stable condition and close to their baseline functioning. The patient denies suicidal or homicidal ideations and is showing future orientation. Patient no longer presented an imminent risk of danger to themselves and/or others. At the time of discharge it appears that the patient has received the maximum medical benefit from this hospitalization and can be appropriately managed with community treatment.     PE: (reviewed) and labs (see medical H&PE)  Labs:    Admission on 05/04/2020, Discharged on 05/07/2020   Component Date Value Ref Range Status    Ventricular Rate 05/04/2020 120  BPM Final    Atrial Rate 05/04/2020 120  BPM Final    P-R Interval 05/04/2020 156  ms Final    QRS Duration 05/04/2020 82  ms Final    Q-T Interval 05/04/2020 302  ms Final    QTc Calculation (Bazett) 05/04/2020 426  ms Final    R Axis 05/04/2020 64  degrees Final    T Axis 05/04/2020 145  degrees Final    Diagnosis 05/04/2020 Sinus tachycardiaRSR' or QR pattern in V1 suggests right ventricular conduction delayNonspecific ST and T wave abnormalityAbnormal ECGNo previous ECGs availableConfirmed by Deana Hutchins MD, [facility] ([id number]) on 5/5/2020 5:31:05 AM   Final    WBC 05/04/2020 9.1  4.0 - 11.0 K/uL Final    RBC 05/04/2020 5.37* 4.00 - 5.20 M/uL Final    Hemoglobin 05/04/2020 16.0  12.0 - 16.0 g/dL Final    Hematocrit 05/04/2020 47.0  36.0 - 48.0 % Final    MCV 05/04/2020 87.5  80.0 - 100.0 fL Final    MCH 05/04/2020 29.8  26.0 - 34.0 pg Final    MCHC 05/04/2020 34.0  31.0 - 36.0 g/dL Final    RDW 05/04/2020 13.7  12.4 - 15.4 % Final    Platelets 79/68/7099 363  135 - 450 K/uL Final    MPV 05/04/2020 7.3  5.0 - 10.5 fL Final    Neutrophils % 05/04/2020 59.0  % Final    Lymphocytes % 05/04/2020 33.0  % Final    Monocytes % 05/04/2020 3.0  % Final    Eosinophils % 05/04/2020 4.0  % Final    Basophils % 05/04/2020 0.0  % Final    Neutrophils Absolute 05/04/2020 5.5  1.7 - 7.7 K/uL Final    Lymphocytes Absolute 05/04/2020 3.0  1.0 - 5.1 K/uL Final    Monocytes Absolute 05/04/2020 0.3  0.0 - 1.3 K/uL Final    Eosinophils Absolute 05/04/2020 0.4  0.0 - 0.6 K/uL Final    Basophils Absolute 05/04/2020 0.0  0.0 - 0.2 K/uL Final    Bands Relative 05/04/2020 1  0 - 7 % Final    Sodium 05/04/2020 137  136 - 145 mmol/L Final    Potassium reflex Magnesium 05/04/2020 4.4  3.5 - 5.1 mmol/L Final    Chloride 05/04/2020 98* 99 - 110 mmol/L Final    CO2 05/04/2020 25  21 - 32 mmol/L Final    Anion Gap 05/04/2020 14  3 - 16 Final    Glucose 05/04/2020 134* 70 - 99 mg/dL Final    BUN 05/04/2020 16  7 - 20 mg/dL Final    CREATININE 05/04/2020 0.9  0.6 - 1.1 mg/dL Final    GFR Non- 05/04/2020 >60  >60 Final    Comment: >60 mL/min/1.73m2 EGFR, calc. for ages 25 and older using the  MDRD formula (not corrected for weight), is valid for stable  renal function.  GFR  05/04/2020 >60  >60 Final    Comment: Chronic Kidney Disease: less than 60 ml/min/1.73 sq.m. Kidney Failure: less than 15 ml/min/1.73 sq.m. Results valid for patients 18 years and older.       Calcium 05/04/2020 9.9  8.3 - 10.6 mg/dL Final    Total Protein 05/04/2020 8.6* 6.4 - 8.2 g/dL Final    Alb 05/04/2020 4.7  3.4 - 5.0 g/dL Final    Albumin/Globulin Ratio 05/04/2020 1.2  1.1 - 2.2 Final    Total Bilirubin 05/04/2020 0.5  0.0 - 1.0 mg/dL Final    Alkaline Phosphatase 05/04/2020 176* 40 - 129 U/L Final    ALT 05/04/2020 65* 10 - 40 U/L Final    AST 05/04/2020 59* 15 - 37 U/L Final    Globulin 05/04/2020 3.9  g/dL Final    Amphetamine Screen, Urine 05/04/2020 POSITIVE* Negative <1000ng/mL Final    Comment: High concentrations of ephedrine/pseudoephedrine or  phenylpropanolamine may cause false positive results  for grooming and good hygiene well-developed, well-nourished  Behavior/Motor:  no abnormalities noted normal gait and station AIMS: 0  Attitude toward examiner:  cooperative, attentive and good eye contact  Speech:  spontaneous, normal rate, normal volume and well articulated  Mood:  dysthymic  Affect:  mood congruent Anxiety: mild  Hallucinations: Absent  Thought processes:  coherent Attention span, Concentration & Attention:  attention span and concentration were age appropriate  Thought content:  Reality based no evidence of delusions OCD: none    Insight: normal insight and judgment Cognition:  oriented to person, place, and time  Fund of Knowledge: average  IQ:average Memory: intact  Suicide:  No specific plan to harm self  Sleep: sleeps through the night  Appetite: ok   Reassess Arabella Risk:  no specific plan to harm self Pt has phone numbers to contact if suicidal thoughts recur and states pt will return to the hospital if suicidal feelings return. Hospital Routine Meds:     QUEtiapine  200 mg Oral Nightly    QUEtiapine  25 mg Oral 4x Daily WC    guaiFENesin  600 mg Oral BID    buprenorphine-naloxone  2 Film Sublingual Daily    buprenorphine  4 mg Sublingual Daily    lamoTRIgine  50 mg Oral Daily    nicotine  1 patch Transdermal Daily    gabapentin  800 mg Oral TID      Hospital PRN Meds: QUEtiapine, acetaminophen, ibuprofen, traZODone, benztropine mesylate, magnesium hydroxide, aluminum & magnesium hydroxide-simethicone   Discharge Meds:    Discharge Medication List as of 5/7/2020 12:43 PM           Details   lamoTRIgine (LAMICTAL) 25 MG tablet Take 2 tablets by mouth daily, Disp-60 tablet, R-0Normal      !! QUEtiapine (SEROQUEL) 300 MG tablet Take 1 tablet by mouth nightly, Disp-30 tablet, R-0Normal      !!  QUEtiapine (SEROQUEL) 25 MG tablet Take 1 tablet by mouth 4 times daily as needed (anxiety), Disp-120 tablet, R-0Normal      guaiFENesin (MUCINEX) 600 MG extended release tablet Take 1 tablet by mouth 2 times daily for 7 days, Disp-14 tablet, R-0Normal       !! - Potential duplicate medications found. Please discuss with provider. Details   buprenorphine-naloxone (SUBOXONE) 2-0.5 MG FILM SL film Place 1 Film under the tongue 2 times daily. Historical Med      gabapentin (NEURONTIN) 800 MG tablet Take 800 mg by mouth 3 times daily. Historical Med      buprenorphine-naloxone (SUBOXONE) 8-2 MG FILM SL film Place 2 Film under the tongue daily.  Takes with the 2 mg twice dailyHistorical Med      ALBUTEROL IN Inhale into the lungsHistorical Med                   Disposition - Residence      Follow Up:  See Discharge Instructions